# Patient Record
Sex: MALE | Race: WHITE | Employment: FULL TIME | URBAN - METROPOLITAN AREA
[De-identification: names, ages, dates, MRNs, and addresses within clinical notes are randomized per-mention and may not be internally consistent; named-entity substitution may affect disease eponyms.]

---

## 2017-04-06 ENCOUNTER — HOSPITAL ENCOUNTER (OUTPATIENT)
Age: 57
Discharge: HOME | End: 2017-04-06
Attending: FAMILY MEDICINE
Payer: COMMERCIAL

## 2017-04-06 VITALS
HEART RATE: 84 BPM | RESPIRATION RATE: 20 BRPM | SYSTOLIC BLOOD PRESSURE: 180 MMHG | OXYGEN SATURATION: 100 % | DIASTOLIC BLOOD PRESSURE: 99 MMHG | TEMPERATURE: 98 F

## 2017-04-06 DIAGNOSIS — L29.0 RECTAL ITCHING: ICD-10-CM

## 2017-04-06 DIAGNOSIS — R21 RASH OF FACE: Primary | ICD-10-CM

## 2017-04-06 DIAGNOSIS — I10 HYPERTENSION, UNCONTROLLED: ICD-10-CM

## 2017-04-06 PROCEDURE — 99204 OFFICE O/P NEW MOD 45 MIN: CPT

## 2017-04-06 PROCEDURE — 99203 OFFICE O/P NEW LOW 30 MIN: CPT

## 2017-04-06 RX ORDER — HYDROCHLOROTHIAZIDE 12.5 MG/1
12.5 CAPSULE, GELATIN COATED ORAL DAILY
COMMUNITY

## 2017-04-06 RX ORDER — VALSARTAN 80 MG/1
80 TABLET ORAL DAILY
COMMUNITY

## 2017-04-06 RX ORDER — CLOTRIMAZOLE AND BETAMETHASONE DIPROPIONATE 10; .64 MG/G; MG/G
1 CREAM TOPICAL 2 TIMES DAILY
Qty: 15 G | Refills: 1 | Status: SHIPPED | OUTPATIENT
Start: 2017-04-06 | End: 2017-04-13

## 2017-04-06 NOTE — ED PROVIDER NOTES
Patient Seen in: [address]    History   Patient presents with:  Rash Skin Problem (integumentary)    Stated Complaint: FACIAL RASH    HPI  24-year-old male with a past medical history significant for hyperlipidemia and 1 Application topically 2 (two) times daily. No family history on file. Smoking Status: Never Smoker                        Review of Systems    Positive for stated complaint: FACIAL RASH  Other systems are as noted in HPI.   Constitutional and v proper diagnosis with that and exam.  He says he believes it might be hemorrhoids based on her discussion about being the most common cause. We discussed using Preparation H. Also discussed the possibility of it being something else such as pinworms.   Al

## 2017-04-06 NOTE — ED INITIAL ASSESSMENT (HPI)
Per pt has had rash mostly around mouth and chin for approx for 10 months it was very mild when it started. Pt reports 4 days ago started using OTC antifungal cream and is now worse.  Pt reports rash has been intermittent and had improved with hydrocortison

## (undated) NOTE — ED AVS SNAPSHOT
[facility] in [address] [address]    Phone:  [phone]           [place]   MRN: [id number]    Department:  [facility] in [facility]   Date of Visit:  4/6/2017 If you change your mind and would like further evaluation please follow-up with your primary care doctor he can return to immediate care at any time.     If you develop fevers, chills, nausea, vomiting, increased pain, increased itching, blood in your stool primary care or a specialist physician for a follow-up visit, please tell this physician (or your personal doctor if your instructions are to return to your personal doctor) about any new or lasting problems.  The primary care or specialist physician may se [address] [facility] [address] [place]) [phone]   [facility] [address] [employer] ([address]) [address] [facility] [employer] ([address] [place] [address],[address]) [place] [address]  [employer]. Remember, MyChart is NOT to be used for urgent needs. For medical emergencies, dial 911.